# Patient Record
Sex: FEMALE | Race: OTHER | HISPANIC OR LATINO
[De-identification: names, ages, dates, MRNs, and addresses within clinical notes are randomized per-mention and may not be internally consistent; named-entity substitution may affect disease eponyms.]

---

## 2017-07-19 ENCOUNTER — APPOINTMENT (OUTPATIENT)
Dept: ENDOCRINOLOGY | Facility: CLINIC | Age: 62
End: 2017-07-19

## 2018-01-24 ENCOUNTER — APPOINTMENT (OUTPATIENT)
Dept: ENDOCRINOLOGY | Facility: CLINIC | Age: 63
End: 2018-01-24

## 2022-08-16 ENCOUNTER — EMERGENCY (EMERGENCY)
Facility: HOSPITAL | Age: 67
LOS: 1 days | Discharge: HOME | End: 2022-08-16
Attending: EMERGENCY MEDICINE | Admitting: EMERGENCY MEDICINE
Payer: MEDICAID

## 2022-08-16 VITALS
SYSTOLIC BLOOD PRESSURE: 155 MMHG | HEART RATE: 70 BPM | TEMPERATURE: 99 F | OXYGEN SATURATION: 99 % | RESPIRATION RATE: 18 BRPM | DIASTOLIC BLOOD PRESSURE: 70 MMHG

## 2022-08-16 VITALS
OXYGEN SATURATION: 100 % | SYSTOLIC BLOOD PRESSURE: 160 MMHG | RESPIRATION RATE: 18 BRPM | TEMPERATURE: 98 F | HEART RATE: 74 BPM | DIASTOLIC BLOOD PRESSURE: 74 MMHG | WEIGHT: 100.09 LBS

## 2022-08-16 PROCEDURE — 99282 EMERGENCY DEPT VISIT SF MDM: CPT

## 2022-08-16 NOTE — ED PROVIDER NOTE - PATIENT PORTAL LINK FT
You can access the FollowMyHealth Patient Portal offered by [facility] by registering at the following website: [email or web address]. By joining [facility]’s FollowMyHealth portal, you will also be able to view your health information using other applications (apps) compatible with our system.

## 2022-08-16 NOTE — ED ADULT NURSE NOTE - OBJECTIVE STATEMENT
Pt presented to er with c/o L ankle pain mostly at night since May, 2022. Pt endorses L foot darkness and numbness. PMH of bypass, DM2, HTN. Pt also reports blurry vision for past few months. Pt denies f/c, n/v/d, cp, sob. Pt A&Ox4, ambulatory with steady gait, speaking in clear/full sentences, no acute distress, vital signs stable.

## 2022-08-16 NOTE — ED PROVIDER NOTE - PHYSICAL EXAMINATION
decreased sensation b/l feet  LLE: hyperpigmented toes and medial malleolar region.   no palpable DP or PT pulses in b/l LE. +doppler signal b/l DP pulses. Pt refusing to allow me to check PT pulses.   b/l LE feel equal normal temp.   No crepitus, firmness, induration, fluctuance. Skin is normal temp. No erythema/warmth. No obvious skin breaks.  PERRL, EOMI, no nystagmus, no proptosis.

## 2022-08-16 NOTE — ED ADULT NURSE NOTE - NEURO ASSESSMENT
Sitting on stretcher. Here fro ER with abd pain and tightness. States its her 1st pregnancy and had sex last night.   Pain started this am. - - -

## 2022-08-16 NOTE — ED ADULT TRIAGE NOTE - CHIEF COMPLAINT QUOTE
Pt 2 yr s/p cardiac surgery in which a vein was removed from her left leg reporting pain, dusky appearance, nighttime swelling to left foot since june. Pt also reports weeks of ongoing blurry vision. Pt also requests HIV testing.

## 2022-08-16 NOTE — ED PROVIDER NOTE - NSFOLLOWUPINSTRUCTIONS_ED_ALL_ED_FT
Your foot symptoms are likely related to "peripheral vascular disease." You may also have "peripheral neuropathy." It is very important to follow up with vascular surgeon. Can call [phone] to schedule appointment.     Can take tylenol 650mg or motrin 600mg (May cause stomach irritation - take with food and avoid prolonged use) every 6hrs as needed for pain.    Stay well hydrated.    Return for fevers, persistent vomit, worsening pain, worsening breathing, worsening lightheaded, worsening swelling, sudden color changes.     Follow up with primary doctor within 1-2 days.     Continue to follow up with your eye doctor for your eye complaints.     Follow up with neurologist for your foot numbness. Can call [phone] to schedule appointment.    Follow up with podiatrist. Can call [phone] to schedule appointment.

## 2022-08-16 NOTE — ED PROVIDER NOTE - OBJECTIVE STATEMENT
67F PMH DM, CABG (June 2021) p/w several complaints. Main concern is L foot pain for 3-4 months, intermittent, worse w/ walking and at night. Also noticed that her toes are darker for several months (not dusky), also worse at night. Pt also notes >3 months of worsening b/l blurry vision - states she saw eye doctor and was told its related to her diabetes. Also has numbness to b/l feet for >3 months. Occasional nausea (none currently). Occasional sensation of heart beating fast (none currently). Also intermittent b/l temporal HA (none currently). No other systemic symptoms.  Denies diplopia, other focal weakness/numbness, vertigo, neck pain, rashes, tinnitus, hearing changes, URI symptoms, f/c, SOB/CP, VD, abd pain, urinary complaints, black/bloody stool.

## 2022-08-16 NOTE — ED PROVIDER NOTE - CLINICAL SUMMARY MEDICAL DECISION MAKING FREE TEXT BOX
67F PMH DM, CABG (June 2021) p/w several complaints. Main concern is L foot pain for 3-4 months, intermittent, worse w/ walking and at night. Also noticed that her toes are darker for several months (not dusky), also worse at night. Pt also notes >3 months of worsening b/l blurry vision - states she saw eye doctor and was told its related to her diabetes. Also has numbness to b/l feet for >3 months. Occasional nausea (none currently). Occasional sensation of heart beating fast (none currently). Also intermittent b/l temporal HA (none currently). No other systemic symptoms.  Mild HTN, other vitals wnl. Exam as above.  ddx: Pt has no acute symptoms. Likely aspect of peripheral vascular disease and peripheral neuropathy. Clinically no acute vascular occlusion or infection.   Given multiple symptoms, I recommended labs/imaging and attempts at pain control. Pt has clinical capacity, does not want to wait in ED for labs.   Discussed importance of outpt follow up and return precautions. Clinically no indication for further emergent ED workup or hospitalization at this time. Stable for dc, outpt f/u.  Pt initially requested HIV but now doesn't want it.

## 2022-08-16 NOTE — ED ADULT NURSE NOTE - CAS ELECT INFOMATION PROVIDED
10:08 AM    I called and left a 2nd voicemail for the patient or patient's parent concerning lab results and instructed them to call back the provided number for results. I also advised that the patient's results were available to view via [employer] and to call the provided number if there are any questions.      Ana Alicia PA-C DC instructions

## 2022-08-16 NOTE — ED ADULT NURSE REASSESSMENT NOTE - NS ED NURSE REASSESS COMMENT FT1
Dr. Schulz at bedside, pt wants to leave. Risk and benefits explained. Pt verbalized understanding. Patient has capacity to decide for self, signed AMA. Pt is ambulatory.

## 2022-08-18 DIAGNOSIS — R20.0 ANESTHESIA OF SKIN: ICD-10-CM

## 2022-08-18 DIAGNOSIS — Z95.1 PRESENCE OF AORTOCORONARY BYPASS GRAFT: ICD-10-CM

## 2022-08-18 DIAGNOSIS — E11.9 TYPE 2 DIABETES MELLITUS WITHOUT COMPLICATIONS: ICD-10-CM

## 2022-08-18 DIAGNOSIS — H53.8 OTHER VISUAL DISTURBANCES: ICD-10-CM

## 2022-08-18 DIAGNOSIS — I73.9 PERIPHERAL VASCULAR DISEASE, UNSPECIFIED: ICD-10-CM

## 2022-08-18 DIAGNOSIS — M79.672 PAIN IN LEFT FOOT: ICD-10-CM
